# Patient Record
(demographics unavailable — no encounter records)

---

## 2025-04-16 NOTE — PHYSICAL EXAM
[Alert] : alert [No Acute Distress] : no acute distress [PERRL] : pupils equal, round and reactive to light [Normal Hearing] : hearing was normal [No Neck Mass] : no neck mass was observed [Thyroid Not Enlarged] : the thyroid was not enlarged [No Thyroid Nodules] : no palpable thyroid nodules [No Respiratory Distress] : no respiratory distress [Clear to Auscultation] : lungs were clear to auscultation bilaterally [Normal Rate] : heart rate was normal [Regular Rhythm] : with a regular rhythm [No Edema] : no peripheral edema [Normal Bowel Sounds] : normal bowel sounds [Soft] : abdomen soft [Normal Supraclavicular Nodes] : no supraclavicular lymphadenopathy [Normal Anterior Cervical Nodes] : no anterior cervical lymphadenopathy [No Clubbing, Cyanosis] : no clubbing  or cyanosis of the fingernails [Normal Reflexes] : deep tendon reflexes were 2+ and symmetric [No Tremors] : no tremors [Oriented x3] : oriented to person, place, and time [Normal Mood] : the mood was normal [de-identified] : Obese

## 2025-04-16 NOTE — ASSESSMENT
[FreeTextEntry1] : 44 y.o. Male, group home resident with Autism, Mental Retardation, Impulse Control Disorder who has PMHx of Obesity, Pre DM, HLD and Hypothyroidism presents for follow up. Former patient of Dr. Balbuena.   Patient here accompanied by group home aid. No acute issues reported. He has been having better diet and consistent with regular exercise daily resulting in 12lb wt loss for the last 6 months.   Currently on: Levothyroxine 50 mcg daily  # Hypothyroidism Clinically and biochemically euthyroid Continue current dose of Levothyroxine 50 mcg daily  # Well controlled T2D Diet controlled  A1C in remains in pre-DM range 5.8% Continue current regimen.   # Obesity Lost 12Lb for the last 6 months.  As per patient aid, he improved his diet, and he is playing in the playground regularly Advised walking for 30 min at least 3 x week.   # HLD Well controlled lipids Continue current regimen with Vascepa and Symvastatin Advised low fat/cholesterol diet  F/u in 6 months.

## 2025-04-16 NOTE — HISTORY OF PRESENT ILLNESS
[FreeTextEntry1] : 44 y.o. Male, group home resident with Autism, Mental Retardation, Impulse Control Disorder who has PMHx of Obesity, Pre DM, HLD and Hypothyroidism presents for follow up. Former patient of Dr. Balbuena.